# Patient Record
Sex: FEMALE | Race: WHITE | NOT HISPANIC OR LATINO | Employment: FULL TIME | URBAN - NONMETROPOLITAN AREA
[De-identification: names, ages, dates, MRNs, and addresses within clinical notes are randomized per-mention and may not be internally consistent; named-entity substitution may affect disease eponyms.]

---

## 2020-06-24 ENCOUNTER — OFFICE VISIT (OUTPATIENT)
Dept: FAMILY MEDICINE CLINIC | Facility: CLINIC | Age: 35
End: 2020-06-24

## 2020-06-24 VITALS
TEMPERATURE: 97.1 F | DIASTOLIC BLOOD PRESSURE: 70 MMHG | BODY MASS INDEX: 37.92 KG/M2 | OXYGEN SATURATION: 99 % | SYSTOLIC BLOOD PRESSURE: 110 MMHG | WEIGHT: 241.6 LBS | HEART RATE: 87 BPM | HEIGHT: 67 IN

## 2020-06-24 DIAGNOSIS — E07.9 THYROID DISEASE: Primary | ICD-10-CM

## 2020-06-24 DIAGNOSIS — R53.83 FATIGUE, UNSPECIFIED TYPE: ICD-10-CM

## 2020-06-24 DIAGNOSIS — G43.901 MIGRAINE WITH STATUS MIGRAINOSUS, NOT INTRACTABLE, UNSPECIFIED MIGRAINE TYPE: ICD-10-CM

## 2020-06-24 DIAGNOSIS — F33.1 MODERATE EPISODE OF RECURRENT MAJOR DEPRESSIVE DISORDER (HCC): ICD-10-CM

## 2020-06-24 PROBLEM — F32.A DEPRESSION: Status: ACTIVE | Noted: 2020-06-24

## 2020-06-24 PROCEDURE — 99203 OFFICE O/P NEW LOW 30 MIN: CPT | Performed by: NURSE PRACTITIONER

## 2020-06-24 PROCEDURE — 83735 ASSAY OF MAGNESIUM: CPT | Performed by: NURSE PRACTITIONER

## 2020-06-24 PROCEDURE — 80053 COMPREHEN METABOLIC PANEL: CPT | Performed by: NURSE PRACTITIONER

## 2020-06-24 PROCEDURE — 85025 COMPLETE CBC W/AUTO DIFF WBC: CPT | Performed by: NURSE PRACTITIONER

## 2020-06-24 PROCEDURE — 82306 VITAMIN D 25 HYDROXY: CPT | Performed by: NURSE PRACTITIONER

## 2020-06-24 PROCEDURE — 82607 VITAMIN B-12: CPT | Performed by: NURSE PRACTITIONER

## 2020-06-24 PROCEDURE — 84443 ASSAY THYROID STIM HORMONE: CPT | Performed by: NURSE PRACTITIONER

## 2020-06-24 PROCEDURE — 83036 HEMOGLOBIN GLYCOSYLATED A1C: CPT | Performed by: NURSE PRACTITIONER

## 2020-06-24 PROCEDURE — 80061 LIPID PANEL: CPT | Performed by: NURSE PRACTITIONER

## 2020-06-24 RX ORDER — BUPROPION HYDROCHLORIDE 150 MG/1
150 TABLET ORAL DAILY
COMMUNITY
End: 2020-06-24 | Stop reason: DRUGHIGH

## 2020-06-24 RX ORDER — BUPROPION HYDROCHLORIDE 75 MG/1
75 TABLET ORAL 2 TIMES DAILY
Qty: 60 TABLET | Refills: 5 | Status: SHIPPED | OUTPATIENT
Start: 2020-06-24 | End: 2021-12-23

## 2020-06-24 RX ORDER — SERTRALINE HYDROCHLORIDE 100 MG/1
100 TABLET, FILM COATED ORAL DAILY
COMMUNITY
Start: 2020-05-26 | End: 2021-03-29 | Stop reason: SDUPTHER

## 2020-06-24 RX ORDER — TOPIRAMATE 25 MG/1
25 TABLET ORAL NIGHTLY
Qty: 30 TABLET | Refills: 5 | Status: SHIPPED | OUTPATIENT
Start: 2020-06-24 | End: 2020-09-28 | Stop reason: SDUPTHER

## 2020-06-24 RX ORDER — LEVOTHYROXINE SODIUM 0.05 MG/1
50 TABLET ORAL DAILY
COMMUNITY
Start: 2020-05-12 | End: 2020-08-04 | Stop reason: SDUPTHER

## 2020-06-25 LAB
25(OH)D3 SERPL-MCNC: 43.3 NG/ML (ref 30–100)
ALBUMIN SERPL-MCNC: 4.4 G/DL (ref 3.5–5.2)
ALBUMIN/GLOB SERPL: 1.5 G/DL
ALP SERPL-CCNC: 86 U/L (ref 39–117)
ALT SERPL W P-5'-P-CCNC: 16 U/L (ref 1–33)
ANION GAP SERPL CALCULATED.3IONS-SCNC: 10.6 MMOL/L (ref 5–15)
AST SERPL-CCNC: 16 U/L (ref 1–32)
BASOPHILS # BLD AUTO: 0.03 10*3/MM3 (ref 0–0.2)
BASOPHILS NFR BLD AUTO: 0.5 % (ref 0–1.5)
BILIRUB SERPL-MCNC: 0.2 MG/DL (ref 0.2–1.2)
BUN BLD-MCNC: 6 MG/DL (ref 6–20)
BUN/CREAT SERPL: 7.2 (ref 7–25)
CALCIUM SPEC-SCNC: 9.3 MG/DL (ref 8.6–10.5)
CHLORIDE SERPL-SCNC: 106 MMOL/L (ref 98–107)
CHOLEST SERPL-MCNC: 133 MG/DL (ref 0–200)
CO2 SERPL-SCNC: 22.4 MMOL/L (ref 22–29)
CREAT BLD-MCNC: 0.83 MG/DL (ref 0.57–1)
DEPRECATED RDW RBC AUTO: 39.1 FL (ref 37–54)
EOSINOPHIL # BLD AUTO: 0.1 10*3/MM3 (ref 0–0.4)
EOSINOPHIL NFR BLD AUTO: 1.5 % (ref 0.3–6.2)
ERYTHROCYTE [DISTWIDTH] IN BLOOD BY AUTOMATED COUNT: 12.7 % (ref 12.3–15.4)
GFR SERPL CREATININE-BSD FRML MDRD: 78 ML/MIN/1.73
GLOBULIN UR ELPH-MCNC: 2.9 GM/DL
GLUCOSE BLD-MCNC: 95 MG/DL (ref 65–99)
HBA1C MFR BLD: 5.3 % (ref 4.8–5.6)
HCT VFR BLD AUTO: 39.6 % (ref 34–46.6)
HDLC SERPL-MCNC: 44 MG/DL (ref 40–60)
HGB BLD-MCNC: 13.2 G/DL (ref 12–15.9)
IMM GRANULOCYTES # BLD AUTO: 0.02 10*3/MM3 (ref 0–0.05)
IMM GRANULOCYTES NFR BLD AUTO: 0.3 % (ref 0–0.5)
LDLC SERPL CALC-MCNC: 58 MG/DL (ref 0–100)
LDLC/HDLC SERPL: 1.33 {RATIO}
LYMPHOCYTES # BLD AUTO: 1.6 10*3/MM3 (ref 0.7–3.1)
LYMPHOCYTES NFR BLD AUTO: 24.1 % (ref 19.6–45.3)
MAGNESIUM SERPL-MCNC: 2 MG/DL (ref 1.6–2.6)
MCH RBC QN AUTO: 28.4 PG (ref 26.6–33)
MCHC RBC AUTO-ENTMCNC: 33.3 G/DL (ref 31.5–35.7)
MCV RBC AUTO: 85.3 FL (ref 79–97)
MONOCYTES # BLD AUTO: 0.39 10*3/MM3 (ref 0.1–0.9)
MONOCYTES NFR BLD AUTO: 5.9 % (ref 5–12)
NEUTROPHILS # BLD AUTO: 4.5 10*3/MM3 (ref 1.7–7)
NEUTROPHILS NFR BLD AUTO: 67.7 % (ref 42.7–76)
NRBC BLD AUTO-RTO: 0 /100 WBC (ref 0–0.2)
PLATELET # BLD AUTO: 202 10*3/MM3 (ref 140–450)
PMV BLD AUTO: 11.6 FL (ref 6–12)
POTASSIUM BLD-SCNC: 4.5 MMOL/L (ref 3.5–5.2)
PROT SERPL-MCNC: 7.3 G/DL (ref 6–8.5)
RBC # BLD AUTO: 4.64 10*6/MM3 (ref 3.77–5.28)
SODIUM BLD-SCNC: 139 MMOL/L (ref 136–145)
TRIGL SERPL-MCNC: 153 MG/DL (ref 0–150)
TSH SERPL DL<=0.05 MIU/L-ACNC: 3.23 UIU/ML (ref 0.27–4.2)
VIT B12 BLD-MCNC: 337 PG/ML (ref 211–946)
VLDLC SERPL-MCNC: 30.6 MG/DL (ref 5–40)
WBC NRBC COR # BLD: 6.64 10*3/MM3 (ref 3.4–10.8)

## 2020-06-29 ENCOUNTER — TELEPHONE (OUTPATIENT)
Dept: FAMILY MEDICINE CLINIC | Facility: CLINIC | Age: 35
End: 2020-06-29

## 2020-06-29 NOTE — TELEPHONE ENCOUNTER
----- Message from COCO Waite sent at 6/29/2020  9:12 AM EDT -----  Labs are ok will a minimal elevation of triglycerides diet and exercise as discussed at visit.  Continue with current dose of synthroid    Spoke with patient and made aware. She voiced understanding.

## 2020-06-30 NOTE — PROGRESS NOTES
Subjective   Preeti Lamb is a 34 y.o. female.   Chief Compliant: The patient presents with Establish Care and Thyroid Problem (labs needed)    Presents today as a new patient to establish care.      Thyroid Problem   Presents for initial (Known hypothyroidism) visit. The condition has lasted for 1 year. Symptoms include anxiety, depressed mood (only takes wellbutrin at times due to loss of any emotion or feelings.) and fatigue. Patient reports no visual change or weight gain. The symptoms have been stable.   Headache    This is a recurrent problem. The current episode started more than 1 year ago. Episode frequency: Now occuring daily. The problem has been unchanged. The pain is located in the occipital region. The pain does not radiate. The quality of the pain is described as pulsating, dull and aching. The pain is at a severity of 5/10. The pain is moderate. Associated symptoms include blurred vision, muscle aches (drops things from time to time), phonophobia, photophobia and scalp tenderness. Pertinent negatives include no abnormal behavior, back pain, fever, insomnia, loss of balance or visual change. Nothing aggravates the symptoms. She has tried NSAIDs, darkened room, Excedrin, cold packs and acetaminophen for the symptoms. The treatment provided mild relief. Her past medical history is significant for migraine headaches.   Depression   Visit Type: initial (started treatment at the age of 11)  Progression since onset: stable  Patient presents with the following symptoms: depressed mood (only takes wellbutrin at times due to loss of any emotion or feelings.) and nervousness/anxiety.  Patient is not experiencing: insomnia, suicidal planning, thoughts of death and weight gain.  Frequency of symptoms: most days   Severity: mild   Aggravated by: family issues  Sleep quality: fair  Nighttime awakenings: occasional       The following portions of the patient's history were reviewed and updated as appropriate:  "allergies, current medications, past family history, past social history, past surgical history and problem list.      Review of Systems   Constitutional: Positive for fatigue. Negative for fever and weight gain.   Eyes: Positive for blurred vision and photophobia.   Respiratory: Negative.    Cardiovascular: Negative.    Musculoskeletal: Negative for back pain.   Neurological: Positive for headaches. Negative for loss of balance.   Psychiatric/Behavioral: The patient is nervous/anxious. The patient does not have insomnia.    All other systems reviewed and are negative.      Procedures    Vitals: Blood pressure 110/70, pulse 87, temperature 97.1 °F (36.2 °C), temperature source Temporal, height 170.2 cm (67\"), weight 110 kg (241 lb 9.6 oz), SpO2 99 %.     Allergies: No Known Allergies       Objective   Physical Exam   Constitutional: She is oriented to person, place, and time. She appears well-developed and well-nourished. No distress.   HENT:   Head: Normocephalic.   Right Ear: External ear normal.   Left Ear: External ear normal.   Nose: Nose normal.   Mouth/Throat: Oropharynx is clear and moist. No oropharyngeal exudate.   Eyes: Pupils are equal, round, and reactive to light. Conjunctivae and EOM are normal. Right eye exhibits no discharge. Left eye exhibits no discharge.   Neck: Neck supple.   Cardiovascular: Normal rate, regular rhythm and normal heart sounds.   No murmur heard.  Pulmonary/Chest: Effort normal and breath sounds normal.   Musculoskeletal: Normal range of motion.   Neurological: She is alert and oriented to person, place, and time. She displays normal reflexes. No cranial nerve deficit or sensory deficit. She exhibits normal muscle tone. Coordination normal.   Skin: Skin is warm and dry. She is not diaphoretic.   Psychiatric: She has a normal mood and affect. Her behavior is normal.   Nursing note and vitals reviewed.      During this visit the following were done:  Labs Reviewed []    Labs " Ordered [x]    Radiology Reports Reviewed []    Radiology Ordered []    PCP Records Reviewed []    Referring Provider Records Reviewed []    ER Records Reviewed []    Hospital Records Reviewed []    History Obtained From Family []    Radiology Images Reviewed []    Other Reviewed []    Records Requested []      Assessment/Plan   Discussed with patient impression and plan, patient verbalizes understanding  Preeti was seen today for establish care and thyroid problem.    Diagnoses and all orders for this visit:    Thyroid disease  -     TSH; Future  -     TSH    Fatigue, unspecified type  -     CBC Auto Differential; Future  -     Comprehensive Metabolic Panel; Future  -     Hemoglobin A1c; Future  -     Lipid Panel; Future  -     Magnesium; Future  -     TSH; Future  -     Vitamin B12; Future  -     Vitamin D 25 Hydroxy; Future  -     CBC Auto Differential  -     Comprehensive Metabolic Panel  -     Hemoglobin A1c  -     Lipid Panel  -     Magnesium  -     TSH  -     Vitamin B12  -     Vitamin D 25 Hydroxy    Migraine with status migrainosus, not intractable, unspecified migraine type  -     CBC Auto Differential; Future  -     Comprehensive Metabolic Panel; Future  -     Magnesium; Future  -     Vitamin B12; Future  -     topiramate (TOPAMAX) 25 MG tablet; Take 1 tablet by mouth Every Night.  -     CBC Auto Differential  -     Comprehensive Metabolic Panel  -     Magnesium  -     Vitamin B12    Moderate episode of recurrent major depressive disorder (CMS/HCC)  -     buPROPion (WELLBUTRIN) 75 MG tablet; Take 1 tablet by mouth 2 (Two) Times a Day.             Patient's Body mass index is 37.84 kg/m². BMI is above normal parameters. Recommendations include: exercise counseling and nutrition counseling.

## 2020-08-04 DIAGNOSIS — E07.9 THYROID DISEASE: ICD-10-CM

## 2020-08-04 RX ORDER — LEVOTHYROXINE SODIUM 0.05 MG/1
50 TABLET ORAL DAILY
Qty: 30 TABLET | Refills: 1 | Status: SHIPPED | OUTPATIENT
Start: 2020-08-04 | End: 2020-10-19

## 2020-09-28 ENCOUNTER — OFFICE VISIT (OUTPATIENT)
Dept: FAMILY MEDICINE CLINIC | Facility: CLINIC | Age: 35
End: 2020-09-28

## 2020-09-28 VITALS
WEIGHT: 243 LBS | BODY MASS INDEX: 38.14 KG/M2 | HEART RATE: 80 BPM | DIASTOLIC BLOOD PRESSURE: 64 MMHG | SYSTOLIC BLOOD PRESSURE: 108 MMHG | OXYGEN SATURATION: 98 % | HEIGHT: 67 IN | TEMPERATURE: 97.3 F

## 2020-09-28 DIAGNOSIS — R51.9 CHRONIC DAILY HEADACHE: Primary | ICD-10-CM

## 2020-09-28 DIAGNOSIS — E07.9 THYROID DISEASE: ICD-10-CM

## 2020-09-28 DIAGNOSIS — F33.1 MODERATE EPISODE OF RECURRENT MAJOR DEPRESSIVE DISORDER (HCC): ICD-10-CM

## 2020-09-28 PROCEDURE — 99214 OFFICE O/P EST MOD 30 MIN: CPT | Performed by: NURSE PRACTITIONER

## 2020-09-28 RX ORDER — TOPIRAMATE 25 MG/1
100 TABLET ORAL NIGHTLY
Qty: 120 TABLET | Refills: 5 | Status: SHIPPED | OUTPATIENT
Start: 2020-09-28 | End: 2020-10-29

## 2020-09-29 NOTE — PROGRESS NOTES
Subjective   Preeti Lamb is a 35 y.o. female.   Chief Compliant: The patient presents with Follow-up and Migraine    Thyroid Problem  Presents for initial (Known hypothyroidism) visit. The condition has lasted for 1 year. Symptoms include anxiety, depressed mood (only takes wellbutrin at times due to loss of any emotion or feelings.) and fatigue. Patient reports no tremors or weight gain. The symptoms have been stable.   Headache   This is a chronic problem. The current episode started more than 1 year ago. The problem occurs daily. The problem has been unchanged (Initially with new start of Topamax felt she experienced less headache.  Now back to daily aggravation.  Not sleeping well falls asleep and awakes in the night restless the rest of the time). The pain is located in the occipital region. The pain does not radiate. The quality of the pain is described as pulsating, dull and aching. The pain is at a severity of 5/10. The pain is moderate. Pertinent negatives include no dizziness, numbness, seizures or weakness. Nothing aggravates the symptoms. She has tried NSAIDs, darkened room, Excedrin, cold packs and acetaminophen for the symptoms. The treatment provided mild relief.   Depression  Visit Type: initial (started treatment at the age of 11.  Feels her symtpoms are stable. )  Progression since onset: stable  Patient presents with the following symptoms: depressed mood (only takes wellbutrin at times due to loss of any emotion or feelings.) and nervousness/anxiety.  Patient is not experiencing: suicidal ideas, suicidal planning, thoughts of death and weight gain.  Frequency of symptoms: most days   Severity: mild   Aggravated by: family issues  Sleep quality: fair  Nighttime awakenings: occasional       The following portions of the patient's history were reviewed and updated as appropriate: allergies, current medications, past family history, past social history, past surgical history and problem  "list.      Review of Systems   Constitutional: Positive for fatigue. Negative for weight gain.   Respiratory: Negative.    Cardiovascular: Negative.    Neurological: Positive for headaches. Negative for dizziness, tremors, seizures, syncope, facial asymmetry, speech difficulty, weakness, light-headedness and numbness.   Psychiatric/Behavioral: Positive for agitation and sleep disturbance. Negative for self-injury and suicidal ideas. The patient is nervous/anxious.    All other systems reviewed and are negative.      Procedures    Vitals: Blood pressure 108/64, pulse 80, temperature 97.3 °F (36.3 °C), height 170.2 cm (67\"), weight 110 kg (243 lb), SpO2 98 %, not currently breastfeeding.     Allergies: No Known Allergies       Objective   Physical Exam   Constitutional: She is oriented to person, place, and time. She appears well-developed. No distress.   HENT:   Head: Normocephalic.   Right Ear: Tympanic membrane, external ear and ear canal normal.   Left Ear: Tympanic membrane, external ear and ear canal normal.   Nose: Nose normal. No rhinorrhea or congestion.   Mouth/Throat: No oropharyngeal exudate.   Eyes: Pupils are equal, round, and reactive to light. Conjunctivae are normal. Right eye exhibits no discharge. Left eye exhibits no discharge.   Neck: Neck supple.   Cardiovascular: Normal rate, regular rhythm and normal heart sounds.   No murmur heard.  Pulmonary/Chest: Effort normal and breath sounds normal.   Musculoskeletal: Normal range of motion.   Neurological: She is alert and oriented to person, place, and time. She displays no weakness and normal reflexes. No cranial nerve deficit or sensory deficit. She exhibits normal muscle tone. Coordination and gait normal.   Skin: Skin is warm and dry. She is not diaphoretic.   Psychiatric: Her behavior is normal.   Nursing note and vitals reviewed.      During this visit the following were done:  Labs Reviewed []    Labs Ordered []    Radiology Reports Reviewed [x] "    Radiology Ordered [x]    PCP Records Reviewed []    Referring Provider Records Reviewed []    ER Records Reviewed []    Hospital Records Reviewed []    History Obtained From Family []    Radiology Images Reviewed []    Other Reviewed []    Records Requested []      Assessment/Plan   Discussed with patient impression and plan, patient verbalizes understanding  Preeti was seen today for follow-up and migraine.    Diagnoses and all orders for this visit:    Chronic daily headache  -     CT Head Without Contrast  -     topiramate (TOPAMAX) 25 MG tablet; Take 4 tablets by mouth Every Night.    Thyroid disease  Continue with current dose of synthroid    Moderate episode of recurrent major depressive disorder (CMS/HCC)  Continue with current meds             Patient's Body mass index is 38.06 kg/m². BMI is above normal parameters. Recommendations include: exercise counseling and nutrition counseling.

## 2020-10-05 ENCOUNTER — HOSPITAL ENCOUNTER (OUTPATIENT)
Dept: CT IMAGING | Facility: HOSPITAL | Age: 35
Discharge: HOME | End: 2020-10-05
Admitting: NURSE PRACTITIONER

## 2020-10-05 PROCEDURE — 70450 CT HEAD/BRAIN W/O DYE: CPT | Performed by: RADIOLOGY

## 2020-10-05 PROCEDURE — 70450 CT HEAD/BRAIN W/O DYE: CPT

## 2020-10-06 ENCOUNTER — TELEPHONE (OUTPATIENT)
Dept: FAMILY MEDICINE CLINIC | Facility: CLINIC | Age: 35
End: 2020-10-06

## 2020-10-06 DIAGNOSIS — R51.9 CHRONIC DAILY HEADACHE: Primary | ICD-10-CM

## 2020-10-06 NOTE — TELEPHONE ENCOUNTER
----- Message from COCO Waite sent at 10/6/2020  9:04 AM EDT -----  Let patient know her Scan is NEG.  Would like to refer to Neurology Referral placed      Spoke with patient,she does request referral,no preference.

## 2020-10-19 DIAGNOSIS — E07.9 THYROID DISEASE: ICD-10-CM

## 2020-10-19 RX ORDER — LEVOTHYROXINE SODIUM 0.05 MG/1
50 TABLET ORAL DAILY
Qty: 30 TABLET | Refills: 5 | Status: SHIPPED | OUTPATIENT
Start: 2020-10-19 | End: 2022-03-29 | Stop reason: SDUPTHER

## 2020-10-29 ENCOUNTER — OFFICE VISIT (OUTPATIENT)
Dept: NEUROLOGY | Facility: CLINIC | Age: 35
End: 2020-10-29

## 2020-10-29 VITALS
DIASTOLIC BLOOD PRESSURE: 68 MMHG | TEMPERATURE: 97.3 F | OXYGEN SATURATION: 94 % | SYSTOLIC BLOOD PRESSURE: 116 MMHG | WEIGHT: 242 LBS | HEART RATE: 81 BPM | BODY MASS INDEX: 37.9 KG/M2

## 2020-10-29 DIAGNOSIS — R27.8 CLUMSINESS: ICD-10-CM

## 2020-10-29 DIAGNOSIS — G43.719 INTRACTABLE CHRONIC MIGRAINE WITHOUT AURA AND WITHOUT STATUS MIGRAINOSUS: Primary | ICD-10-CM

## 2020-10-29 DIAGNOSIS — R51.9 INTRACTABLE EPISODIC HEADACHE, UNSPECIFIED HEADACHE TYPE: ICD-10-CM

## 2020-10-29 DIAGNOSIS — R06.83 SNORING: ICD-10-CM

## 2020-10-29 PROBLEM — E28.2 POLYCYSTIC OVARY SYNDROME: Status: ACTIVE | Noted: 2020-10-29

## 2020-10-29 PROBLEM — E06.3 HASHIMOTO'S THYROIDITIS: Status: ACTIVE | Noted: 2020-10-29

## 2020-10-29 PROCEDURE — 99215 OFFICE O/P EST HI 40 MIN: CPT | Performed by: NURSE PRACTITIONER

## 2020-10-29 RX ORDER — FREMANEZUMAB-VFRM 225 MG/1.5ML
675 INJECTION SUBCUTANEOUS
Qty: 3 PEN | Refills: 3 | Status: SHIPPED | OUTPATIENT
Start: 2020-10-29 | End: 2021-12-22

## 2020-10-29 RX ORDER — RIZATRIPTAN BENZOATE 10 MG/1
TABLET ORAL
Qty: 30 TABLET | Refills: 2 | Status: SHIPPED | OUTPATIENT
Start: 2020-10-29 | End: 2021-01-29

## 2020-10-29 NOTE — PROGRESS NOTES
Subjective:     Patient ID: Preeti Lamb is a 35 y.o. female.    CC:   Chief Complaint   Patient presents with   • Migraine   • Headache       HPI:   History of Present Illness     This is a very pleasant 35-year-old female who presents for initial neurological evaluation and daily headaches and migraine headaches present over the past 3 years but significantly worsening over the past year.  Her mom does get migraines.  She tells me every single day she has a headache.  She has at least 8 migraine days per month which are moderate to severe pain, cause blurred vision, cognitive change and clumsiness with dropping items prior to the migraine but denies paresthesias, weakness or slurred speech, hurts in the forehead, throbbing, can move side to side, allodynia, nausea, rare vomiting, positive photophobia and positive phonophobia and normally last 8 hours or more.  Most of the time she will take a Midol complete with Benadryl and go to sleep.  She had been taking Topamax since July 2020 and started the 25 mg and was recently up to 100 mg at night but this caused her to be very slow cognitively and she could not think of words so she stopped taking this about a week ago.  She tells me that it minimally helps with severity of headaches but she still had daily headaches.  She currently is treated for depression with sertraline as well as bupropion and both of these are antidepressants but neither have helped with her migraines at all.  She has not used propranolol a beta-blocker but this would be high risk with her significant depression.  She has not used anything other than Tylenol, ibuprofen or over-the-counter abortive medications for migraines.  She does tell me that she does snore.  She has never had a sleep study.  She does still wake up in the mornings tired.  She and her  do have 3 kids and she tells me that they are finished having children.  She did have a CT scan of her head without contrast completed on  10/5/2020 with [facility] and this was read as normal by radiologist and we reviewed imaging today in office and CT of the brain does appear completely unremarkable.  She did recently have blood work with her PCP in June 2020 including CBC with differential normal, CMP normal, hemoglobin A1c normal at 5.3%, lipid panel normal with triglycerides slight elevation 153, magnesium normal, TSH normal, B12 slightly low normal at 337 and vitamin D 43.3.  She is not sure what has made her headaches worse over the past year.  Sleeping is the only thing that really helps.  She does live in [place], [place] and would like to treat migraines the best possible without having to travel often to [place].    The following portions of the patient's history were reviewed and updated as appropriate: allergies, current medications, past family history, past medical history, past social history, past surgical history and problem list.    Past Medical History:   Diagnosis Date   • Allergic    • Depression    • Functional ovarian cysts    • Hashimoto's disease    • Low serum progesterone    • PCOS (polycystic ovarian syndrome)    • Sinus complaint    • Thyroid disease        Past Surgical History:   Procedure Laterality Date   • DILATATION AND CURETTAGE     • LEEP         Social History     Socioeconomic History   • Marital status:      Spouse name: Not on file   • Number of children: Not on file   • Years of education: Not on file   • Highest education level: Not on file   Tobacco Use   • Smoking status: Never Smoker   • Smokeless tobacco: Never Used   Substance and Sexual Activity   • Alcohol use: Yes     Comment: rarely   • Drug use: Never   • Sexual activity: Yes       Family History   Problem Relation Age of Onset   • Diabetes Mother    • Hypertension Mother    • Crohn's disease Mother    • Migraines Mother    • Hypertension Father    • Alcohol abuse Father    • Atrial fibrillation Father    • Thyroid disease Maternal  Grandmother         Review of Systems   Constitutional: Positive for fatigue. Negative for chills, fever and unexpected weight change.   HENT: Negative for ear pain, hearing loss, nosebleeds, rhinorrhea and sore throat.    Eyes: Positive for visual disturbance. Negative for photophobia, pain, discharge and itching.   Respiratory: Negative for cough, chest tightness, shortness of breath and wheezing.    Cardiovascular: Negative for chest pain, palpitations and leg swelling.   Gastrointestinal: Negative for abdominal pain, blood in stool, constipation, diarrhea, nausea and vomiting.   Genitourinary: Positive for frequency. Negative for dysuria, hematuria and urgency.   Musculoskeletal: Negative for arthralgias, back pain, gait problem, joint swelling, myalgias, neck pain and neck stiffness.   Skin: Negative for rash and wound.   Allergic/Immunologic: Negative for environmental allergies and food allergies.   Neurological: Positive for headaches. Negative for dizziness, tremors, seizures, syncope, speech difficulty, weakness, light-headedness and numbness.   Hematological: Negative for adenopathy. Does not bruise/bleed easily.   Psychiatric/Behavioral: Positive for sleep disturbance. Negative for agitation, confusion, decreased concentration, hallucinations and suicidal ideas. The patient is not nervous/anxious.    All other systems reviewed and are negative.       Objective:  /68   Pulse 81   Temp 97.3 °F (36.3 °C)   Wt 110 kg (242 lb)   SpO2 94%   BMI 37.90 kg/m²     Neurologic Exam     Mental Status   Oriented to person, place, and time.   Registration: recalls 3 of 3 objects. Recall at 5 minutes: recalls 3 of 3 objects. Follows 3 step commands.   Attention: normal. Concentration: normal.   Speech: speech is normal   Level of consciousness: alert  Knowledge: good and consistent with education. Able to perform simple calculations.   Able to name object. Able to read. Able to repeat. Able to write. Normal  comprehension.     Cranial Nerves   Cranial nerves II through XII intact.     CN III, IV, VI   Pupils are equal, round, and reactive to light.    Motor Exam   Muscle bulk: normal  Overall muscle tone: normal    Strength   Strength 5/5 throughout.     Sensory Exam   Light touch normal.   Vibration normal.   Proprioception normal.   Pinprick normal.     Gait, Coordination, and Reflexes     Gait  Gait: normal    Coordination   Romberg: negative  Finger to nose coordination: normal  Heel to shin coordination: normal    Tremor   Resting tremor: absent  Intention tremor: absent  Action tremor: absent    Reflexes   Right brachioradialis: 2+  Left brachioradialis: 2+  Right biceps: 2+  Left biceps: 2+  Right triceps: 2+  Left triceps: 2+  Right patellar: 2+  Left patellar: 2+  Right achilles: 2+  Left achilles: 2+  Right : 2+  Left : 2+  Right plantar: normal  Left plantar: normal  Right Ham: absent  Left Ham: absent  Right ankle clonus: absent  Left ankle clonus: absent      Physical Exam  Constitutional:       Appearance: She is obese.   Eyes:      Pupils: Pupils are equal, round, and reactive to light.   Neck:      Musculoskeletal: Normal range of motion. Muscular tenderness present. No edema, erythema, neck rigidity, crepitus, injury, pain with movement, torticollis or spinous process tenderness.   Cardiovascular:      Rate and Rhythm: Normal rate and regular rhythm.      Heart sounds: Normal heart sounds, S1 normal and S2 normal.   Pulmonary:      Effort: Pulmonary effort is normal.      Breath sounds: Normal breath sounds.   Neurological:      Mental Status: She is alert and oriented to person, place, and time.      Coordination: Finger-Nose-Finger Test, Heel to Shin Test and Romberg Test normal.      Gait: Gait is intact.      Deep Tendon Reflexes: Strength normal.      Reflex Scores:       Tricep reflexes are 2+ on the right side and 2+ on the left side.       Bicep reflexes are 2+ on the right side  and 2+ on the left side.       Brachioradialis reflexes are 2+ on the right side and 2+ on the left side.       Patellar reflexes are 2+ on the right side and 2+ on the left side.       Achilles reflexes are 2+ on the right side and 2+ on the left side.  Psychiatric:         Attention and Perception: Attention and perception normal.         Mood and Affect: Mood and affect normal.         Speech: Speech normal.         Behavior: Behavior normal.         Thought Content: Thought content normal.         Cognition and Memory: Cognition and memory normal.         Judgment: Judgment normal.         Assessment/Plan:       Diagnoses and all orders for this visit:    1. Intractable chronic migraine without aura and without status migrainosus (Primary)  -     Ajovy 225 MG/1.5ML solution auto-injector; Inject 675 mg under the skin into the appropriate area as directed Every 3 (Three) Months.  Dispense: 3 pen; Refill: 3  -     rizatriptan (Maxalt) 10 MG tablet; Take 1 tablet at onset of migraine. May repeat in 2 hours if needed  Dispense: 30 tablet; Refill: 2    2. Intractable episodic headache, unspecified headache type  -     MRI Brain Without Contrast; Future    3. Clumsiness  -     MRI Brain Without Contrast; Future    4. Snoring  -     Ambulatory Referral to Sleep Medicine           Total time of visit 40 minutes face-to-face with 30 minutes spent discussion and counseling on results and manage care moving forward.  Recommend MRI of the brain without contrast rule out brain lesion with headaches and clumsiness.  For her migraines we will start with Ajovy injections monthly for migraine prevention.  I did also discuss Botox injections every 12 weeks but due to her long commute she would like to try the monthly injections first.  We will add rizatriptan for abortive migraine treatment.  Referral to sleep medicine for telehealth consultation for likely sleep apnea.  Follow-up in 3 months via telehealth video visit or sooner  if needed.  Reviewed medications, potential side effects and signs and symptoms to report. Discussed risk versus benefits of treatment plan with patient and/or family-including medications, labs and radiology that may be ordered. Addressed questions and concerns during visit. Patient and/or family verbalized understanding and agree with plan.    AS THE PROVIDER, I PERSONALLY WORE PPE DURING ENTIRE FACE TO FACE ENCOUNTER IN CLINIC WITH THE PATIENT. PATIENT ALSO WORE PPE DURING ENTIRE FACE TO FACE ENCOUNTER EXCEPT FOR A MAX OF 30 SECONDS DURING NEUROLOGICAL EVALUATION OF CRANIAL NERVES AND THEN MASK WAS PLACED BACK OVER PATIENT FACE FOR REMAINDER OF VISIT. I WASHED MY HANDS BEFORE AND AFTER VISIT.    During this visit the following were done:  Labs Reviewed [x]    Labs Ordered []    Radiology Reports Reviewed [x]    Radiology Ordered [x]    PCP Records Reviewed [x]    Referring Provider Records Reviewed [x]    ER Records Reviewed []    Hospital Records Reviewed []    History Obtained From Family []    Radiology Images Reviewed [x]    Other Reviewed [x]    Records Requested []      I am prescribing AJOVY Injections for migraine prevention management for this patient.This is a brand new classification human monoclonal antibody that binds to CGRP ligand and blocks it from binding to the receptor which is shown to prevent migraines. There is limited long term data on the effects of this medication and this has been disclosed to the patient before prescribing this medication. There is no contraindication at this time for this medication. It is FDA approved for episodic and chronic migraine prevention.      The only side effects reported during clinical trials were injection site irritation, redness and tenderness that can last 12-72 hours and should self resolve. I have also educated patient that this drug is not metabolized by the kidneys or liver and at this time no adverse effects or risks are known with liver and  kidney disease.     I have also discussed that this medication has not been evaluated for potential for immunogenicity.     I have also discussed that this medication has not been studied in patients 65 years of age or older and also Cardiovascular disease including MI, TIA, CVA, CAD etc within 1 year of starting the medication; these patient's were excluded from the trials.     There is no adequate data available on pregnancy and breastfeeding but we do know the medication does pass through placenta and breast milk and at this time there is no contraindication for pregnancy and lactation but there is insufficient date for me to feel comfortable to prescribe this for that population. If you do become pregnant please notify our office so we can stop this medication. This medication also takes 5 months to be cleared from your system so if you are planning to become pregnant, please discuss with me ahead of time so we can stop this medication and have 5 months of medication clearance before attempting to conceive to ensure no adverse effects for the fetus.    Cancer, Genetic Mutations and Impairment in Fertility of Ajovy have not been studied.     I have reviewed administration education with patient and/or family and provided a hand out on safe administration and discarding of Ajovy. Encouraged patient to report any adverse effects.     Patient has expressed understanding of the above discussed and accepts risk and benefits of starting this medication.    Shanon Herrera, APRN  10/29/2020

## 2020-11-10 ENCOUNTER — TELEPHONE (OUTPATIENT)
Dept: NEUROLOGY | Facility: CLINIC | Age: 35
End: 2020-11-10

## 2020-11-10 NOTE — TELEPHONE ENCOUNTER
----- Message from Preeti Lamb sent at 11/10/2020  2:45 PM EST -----  Regarding: RE: Referral Request  Contact: [phone]  The MRI you requested has been denied by my insurance. What is the next step?

## 2020-11-11 NOTE — TELEPHONE ENCOUNTER
Her CT brain was normal and we cannot resubmit anything for 90 days ([place] medicaid won't allow) so it will need to be canceled for now. Let patient know her insurance will not approve the MRI of her brain. If her migraines and clumsiness are not better when we complete her next visit, we can try to reorder. Thanks, Shanon

## 2021-01-13 ENCOUNTER — TELEMEDICINE (OUTPATIENT)
Dept: SLEEP MEDICINE | Facility: HOSPITAL | Age: 36
End: 2021-01-13

## 2021-01-13 DIAGNOSIS — F51.04 PSYCHOPHYSIOLOGICAL INSOMNIA: ICD-10-CM

## 2021-01-13 DIAGNOSIS — G47.33 OBSTRUCTIVE SLEEP APNEA, ADULT: ICD-10-CM

## 2021-01-13 DIAGNOSIS — E66.09 CLASS 2 OBESITY DUE TO EXCESS CALORIES WITHOUT SERIOUS COMORBIDITY WITH BODY MASS INDEX (BMI) OF 36.0 TO 36.9 IN ADULT: ICD-10-CM

## 2021-01-13 DIAGNOSIS — R06.83 SNORING: Primary | ICD-10-CM

## 2021-01-13 PROCEDURE — 99203 OFFICE O/P NEW LOW 30 MIN: CPT | Performed by: INTERNAL MEDICINE

## 2021-01-14 NOTE — PROGRESS NOTES
Subjective   Preeti Lamb is a 35 y.o. female is being seen for consultation today at the request of ANSELMO Carrera for the evaluation of difficulty getting to sleep and staying asleep.  Her primary care provider is [facility] Waite.  You have chosen to receive care through a telehealth visit.  Do you consent to use a video/audio connection for your medical care today? Yes    History of Present Illness  Patient has been having problems with a lot of headaches and has been seeing neurology.  She is referred for evaluation of possible sleep disordered breathing is contributing factor.  She says she has both problems falling asleep staying asleep at night.  She awakens with a headache almost every day.  She has a history of snoring for 13 years.  She has also had apneas noted for 13 years.  She is sleepy during the day but does not fall asleep.  She denies problems driving.    She has a lot of nasal allergies but denies ever breaking her nose.  She has occasional kicking of her legs at night and sometimes has an uncomfortable feeling in her legs.  She denies any history of iron deficiency.  She does not think her leg movements keeps her awake.  She denies having chronic pain that keeps her awake.    She goes to bed about 10 PM.  She will fall asleep in about an hour.  She awakens a couple of times during the night.  She thinks she gets about 5 hours of sleep but is not rested.  She denies any history of hypertension, diabetes, or coronary artery disease.  She has a history of hypothyroidism and depression.  She was previously diagnosed with Hashimoto's thyroiditis  Allergies   Allergen Reactions   • Penicillins Hives   She has seasonal environmental allergies      Current Outpatient Medications:   •  Ajovy 225 MG/1.5ML solution auto-injector, Inject 675 mg under the skin into the appropriate area as directed Every 3 (Three) Months., Disp: 3 pen, Rfl: 3  •  buPROPion (WELLBUTRIN) 75 MG tablet, Take 1 tablet by  mouth 2 (Two) Times a Day., Disp: 60 tablet, Rfl: 5  •  levothyroxine (SYNTHROID, LEVOTHROID) 50 MCG tablet, TAKE 1 TABLET BY MOUTH DAILY, Disp: 30 tablet, Rfl: 5  •  rizatriptan (Maxalt) 10 MG tablet, Take 1 tablet at onset of migraine. May repeat in 2 hours if needed, Disp: 30 tablet, Rfl: 2  •  sertraline (ZOLOFT) 100 MG tablet, Take 100 mg by mouth Daily., Disp: , Rfl:     Social History    Tobacco Use      Smoking status: Never Smoker      Smokeless tobacco: Never Used       Social History     Substance and Sexual Activity   Alcohol Use Yes    Comment: rarely       Caffeine: She has 1 cup of coffee per day and may have 1 cola every other day    Past Medical History:   Diagnosis Date   • Allergic    • Depression    • Functional ovarian cysts    • Hashimoto's disease    • Low serum progesterone    • PCOS (polycystic ovarian syndrome)    • Sinus complaint    • Thyroid disease        Past Surgical History:   Procedure Laterality Date   • DILATATION AND CURETTAGE x3     • LEEP         Family History   Problem Relation Age of Onset   • Diabetes Mother    • Hypertension Mother    • Crohn's disease Mother    • Migraines Mother    • Hypertension Father    • Alcohol abuse Father    • Atrial fibrillation Father    • Thyroid disease Maternal Grandmother        The following portions of the patient's history were reviewed and updated as appropriate: allergies, current medications, past family history, past medical history, past social history, past surgical history and problem list.    Review of Systems   Constitutional: Positive for fatigue.   Eyes: Negative.    Respiratory: Negative.    Cardiovascular: Negative.    Gastrointestinal: Negative.    Endocrine: Negative.    Genitourinary: Positive for frequency and urgency.   Musculoskeletal: Negative.    Skin: Negative.    Allergic/Immunologic: Positive for environmental allergies.   Neurological: Positive for weakness and headaches.   Hematological: Negative.     Psychiatric/Behavioral: Negative.    [place] score is 5/24    Objective     There were no vitals taken for this visit.     Physical Exam  Patient is awake and alert.  She does not appear to be in acute respiratory distress.  Her stated weight is 243 pounds.  Her height is 5 feet 7 inches.  Her body mass index is 36.  She appears to have Mallampati class III anatomy.    Assessment/Plan   Diagnoses and all orders for this visit:    1. Snoring (Primary)  -     Home Sleep Study; Future    2. Obstructive sleep apnea, adult  -     Home Sleep Study; Future    3. Psychophysiological insomnia    4. Class 2 obesity due to excess calories without serious comorbidity with body mass index (BMI) of 36.0 to 36.9 in adult    Patient has a history of snoring and frequently nonrestorative sleep.  She gives an excellent story for obstructive sleep apnea.  We will plan to proceed to home sleep testing.  We have discussed possible therapies including CPAP, weight control, oral appliance, and surgery.  We have also discussed the long-term consequences of untreated obstructive sleep apnea.  These include hypertension, diabetes, heart disease, stroke, and dementia.  She is encouraged to lose weight.  She is encouraged to avoid alcohol and sedatives close to bedtime.  She is encouraged to practice lateral position sleep.  We will see her back after her study.    Patient also has some psychophysiologic insomnia.  We have discussed measures to try to improve her sleep hygiene including getting a regular bedtime and a regular rise time.  She is encouraged to exercise regularly.  She is to get light exposure early in the day.  She is develop a bedtime ritual.  This may need to be addressed further after her sleep study.    Total time: 30 minutes exclusive of procedures.         Jak Mancini MD [facility]  Sleep Medicine  Pulmonary and Critical Care Medicine

## 2021-01-19 ENCOUNTER — HOSPITAL ENCOUNTER (OUTPATIENT)
Dept: SLEEP MEDICINE | Facility: HOSPITAL | Age: 36
Discharge: HOME | End: 2021-01-19
Admitting: INTERNAL MEDICINE

## 2021-01-19 VITALS — WEIGHT: 243 LBS | HEIGHT: 67 IN | BODY MASS INDEX: 38.14 KG/M2

## 2021-01-19 DIAGNOSIS — G47.33 OBSTRUCTIVE SLEEP APNEA, ADULT: ICD-10-CM

## 2021-01-19 DIAGNOSIS — R06.83 SNORING: ICD-10-CM

## 2021-01-19 PROCEDURE — 95800 SLP STDY UNATTENDED: CPT

## 2021-01-19 PROCEDURE — 95800 SLP STDY UNATTENDED: CPT | Performed by: INTERNAL MEDICINE

## 2021-01-20 DIAGNOSIS — G47.33 OSA (OBSTRUCTIVE SLEEP APNEA): Primary | ICD-10-CM

## 2021-01-21 NOTE — PROGRESS NOTES
CALLED PATIENT AND ADVISED OF STUDY RESULTS. PATIENT VERBALIZED UNDERSTANDING AND WAS AGREEABLE TO PAP THERAPY. FAXED ORDER TO [facility] 01/21/21 [facility]

## 2021-01-29 ENCOUNTER — TELEPHONE (OUTPATIENT)
Dept: NEUROLOGY | Facility: CLINIC | Age: 36
End: 2021-01-29

## 2021-01-29 ENCOUNTER — TELEMEDICINE (OUTPATIENT)
Dept: NEUROLOGY | Facility: CLINIC | Age: 36
End: 2021-01-29

## 2021-01-29 DIAGNOSIS — G43.009 MIGRAINE WITHOUT AURA AND WITHOUT STATUS MIGRAINOSUS, NOT INTRACTABLE: ICD-10-CM

## 2021-01-29 DIAGNOSIS — G43.719 INTRACTABLE CHRONIC MIGRAINE WITHOUT AURA AND WITHOUT STATUS MIGRAINOSUS: Primary | ICD-10-CM

## 2021-01-29 PROCEDURE — 99213 OFFICE O/P EST LOW 20 MIN: CPT | Performed by: NURSE PRACTITIONER

## 2021-01-29 RX ORDER — RIMEGEPANT SULFATE 75 MG/75MG
75 TABLET, ORALLY DISINTEGRATING ORAL DAILY PRN
Qty: 8 TABLET | Refills: 5 | Status: SHIPPED | OUTPATIENT
Start: 2021-01-29 | End: 2022-03-29

## 2021-01-29 RX ORDER — SUMATRIPTAN 50 MG/1
50 TABLET, FILM COATED ORAL ONCE AS NEEDED
Qty: 30 TABLET | Refills: 2 | Status: SHIPPED | OUTPATIENT
Start: 2021-01-29 | End: 2022-03-29 | Stop reason: ALTCHOICE

## 2021-01-29 NOTE — PROGRESS NOTES
Subjective:     Patient ID: Preeti Lamb is a 35 y.o. female.    CC:   Chief Complaint   Patient presents with   • Migraine       HPI:   History of Present Illness     Due to COVID-19 Pandemic, this visit was completed face to face via VIDEO by Epic/[employer] with verbal consent to treat obtained from patient.      You have chosen to receive care through a telehealth visit.  Do you consent to use a video/audio connection for your medical care today? Yes    This is a very pleasant 35-year-old female who presents for 3 month follow up on daily headaches and migraine headaches present over the past 3 years but significantly worsening over the past year.  Her mom does get migraines.  She tells me every single day she has a headache.  She has at least 8 migraine days per month which are moderate to severe pain, cause blurred vision, cognitive change and clumsiness with dropping items prior to the migraine but denies paresthesias, weakness or slurred speech, hurts in the forehead, throbbing, can move side to side, allodynia, nausea, rare vomiting, positive photophobia and positive phonophobia and normally last 8 hours or more.     Since starting Ajovy injections 2 months ago she has seen a reduction in migraine frequency and severity by over 50%. She continues with daily headaches but has only had 2 migraines a month over past 3 months. Rizatriptan does not help at all. Would be willing to try a different triptan or alternative.  She has not had clumsiness with recent migraines.    Previous meds tried: She had been taking Topamax since July 2020 and started the 25 mg and was recently up to 100 mg at night but this caused her to be very slow cognitively and she could not think of words so she stopped taking this about a week ago.  She tells me that it minimally helps with severity of headaches but she still had daily headaches.  She currently is treated for depression with sertraline as well as bupropion and both of these are  antidepressants but neither have helped with her migraines at all.  She has not used propranolol a beta-blocker but this would be high risk with her significant depression.     She did have a CT scan of her head without contrast completed on 10/5/2020 with [facility] and this was read as normal by radiologist and we reviewed imaging at first visit and CT of the brain does appear completely unremarkable. Screening labs with PCP unremarkable.    The following portions of the patient's history were reviewed and updated as appropriate: allergies, current medications, past family history, past medical history, past social history, past surgical history and problem list.    Past Medical History:   Diagnosis Date   • Allergic    • Depression    • Functional ovarian cysts    • Hashimoto's disease    • Low serum progesterone    • PCOS (polycystic ovarian syndrome)    • Sinus complaint    • Thyroid disease        Past Surgical History:   Procedure Laterality Date   • DILATATION AND CURETTAGE     • LEEP         Social History     Socioeconomic History   • Marital status:      Spouse name: Not on file   • Number of children: Not on file   • Years of education: Not on file   • Highest education level: Not on file   Tobacco Use   • Smoking status: Never Smoker   • Smokeless tobacco: Never Used   Substance and Sexual Activity   • Alcohol use: Yes     Comment: rarely   • Drug use: Never   • Sexual activity: Yes       Family History   Problem Relation Age of Onset   • Diabetes Mother    • Hypertension Mother    • Crohn's disease Mother    • Migraines Mother    • Hypertension Father    • Alcohol abuse Father    • Atrial fibrillation Father    • Thyroid disease Maternal Grandmother         Review of Systems   Neurological: Positive for headaches.   All other systems reviewed and are negative.       Objective:  There were no vitals taken for this visit.  Not obtained d/t video visit    Neurologic Exam     Mental Status    Oriented to person, place, and time.   Speech: speech is normal   Level of consciousness: alert    Cranial Nerves     CN II   Visual fields full to confrontation.     CN III, IV, VI   Pupils are equal, round, and reactive to light.  Extraocular motions are normal.     CN VII   Facial expression full, symmetric.     CN VIII   CN VIII normal.       Physical Exam  Eyes:      Extraocular Movements: EOM normal.      Pupils: Pupils are equal, round, and reactive to light.   Neurological:      Mental Status: She is oriented to person, place, and time.   Psychiatric:         Mood and Affect: Mood and affect normal.         Speech: Speech normal.     ARIADNA FULLY D/T VIDEO VISIT     Assessment/Plan:       Diagnoses and all orders for this visit:    1. Intractable chronic migraine without aura and without status migrainosus (Primary)  Comments:  Continue Ajovy injections monthly. Diagnosed with SCOTTIE and pending CPAP use.  Orders:  -     SUMAtriptan (Imitrex) 50 MG tablet; Take 1 tablet by mouth 1 (One) Time As Needed for Migraine. Take one tablet at onset of headache. May repeat dose one time in 2 hours if headache not relieved.  Dispense: 30 tablet; Refill: 2    2. Migraine without aura and without status migrainosus, not intractable  -     rimegepant 75 MG dispersible tablet; Take 75 mg by mouth Daily As Needed (migraine).  Dispense: 8 tablet; Refill: 5           Continue Ajovy injections monthly for migraine prevention. Had recent sleep study confirming sleep apnea-pending CPAP approval w/ insurance. Switch to sumatriptan along with nurtec odt PRN. F/U in 4 months or sooner if needed.    Reviewed medications, potential side effects and signs and symptoms to report. Discussed risk versus benefits of treatment plan with patient and/or family-including medications, labs and radiology that may be ordered. Addressed questions and concerns during visit. Patient and/or family verbalized understanding and agree with plan.    Other  Reviewed [x]  Sleep clinic notes and sleep test results.      Shanon Herrera, APRN  1/29/2021

## 2021-01-29 NOTE — TELEPHONE ENCOUNTER
SCHEDULED PT A 4 MTH VIDEO VISIT AND [facility], MAILED PT A REMINDER

## 2021-01-29 NOTE — TELEPHONE ENCOUNTER
----- Message from COCO Carrera sent at 1/29/2021  9:26 AM EST -----  Call and schedule 4 month video visit follow up. [facility]. Thanks.

## 2021-03-18 ENCOUNTER — BULK ORDERING (OUTPATIENT)
Dept: CASE MANAGEMENT | Facility: OTHER | Age: 36
End: 2021-03-18

## 2021-03-18 DIAGNOSIS — Z23 IMMUNIZATION DUE: ICD-10-CM

## 2021-03-27 RX ORDER — CETIRIZINE HYDROCHLORIDE 10 MG/1
10 TABLET ORAL DAILY
Qty: 90 TABLET | Refills: 1 | Status: SHIPPED | OUTPATIENT
Start: 2021-03-27 | End: 2021-04-05

## 2021-03-29 DIAGNOSIS — F33.1 MODERATE EPISODE OF RECURRENT MAJOR DEPRESSIVE DISORDER (HCC): ICD-10-CM

## 2021-03-29 RX ORDER — SERTRALINE HYDROCHLORIDE 100 MG/1
100 TABLET, FILM COATED ORAL DAILY
Qty: 90 TABLET | Refills: 1 | Status: SHIPPED | OUTPATIENT
Start: 2021-03-29 | End: 2021-10-06

## 2021-04-05 ENCOUNTER — TELEMEDICINE (OUTPATIENT)
Dept: SLEEP MEDICINE | Facility: HOSPITAL | Age: 36
End: 2021-04-05

## 2021-04-05 VITALS — WEIGHT: 240 LBS | HEIGHT: 67 IN | BODY MASS INDEX: 37.67 KG/M2

## 2021-04-05 DIAGNOSIS — G47.33 OSA (OBSTRUCTIVE SLEEP APNEA): Primary | ICD-10-CM

## 2021-04-05 PROCEDURE — 99212 OFFICE O/P EST SF 10 MIN: CPT | Performed by: NURSE PRACTITIONER

## 2021-04-05 NOTE — PROGRESS NOTES
"    Chief Complaint:   Chief Complaint   Patient presents with   • Follow-up       HPI:    Preeti Lamb is a 35 y.o. female here for follow-up of sleep apnea.  Patient was last seen 1/13/2021 with complaints of migraines, trouble falling asleep and staying asleep, and witnessed apneas.  Patient had a sleep study 1/19/2021 that did show mild sleep apnea with an AHI of 6.0 patient did initiate CPAP therapy.  Patient states she is sleeping 7 hours nightly and does feel rested upon awakening.  Patient states she will still occasionally get up during the night but this is \"much improved than before using CPAP.  Patient has an [place] score of 3/24.  Patient is not having any difficulty or complaints with CPAP and wishes to continue.        Current medications are:   Current Outpatient Medications:   •  Ajovy 225 MG/1.5ML solution auto-injector, Inject 675 mg under the skin into the appropriate area as directed Every 3 (Three) Months., Disp: 3 pen, Rfl: 3  •  buPROPion (WELLBUTRIN) 75 MG tablet, Take 1 tablet by mouth 2 (Two) Times a Day., Disp: 60 tablet, Rfl: 5  •  levothyroxine (SYNTHROID, LEVOTHROID) 50 MCG tablet, TAKE 1 TABLET BY MOUTH DAILY, Disp: 30 tablet, Rfl: 5  •  rimegepant 75 MG dispersible tablet, Take 75 mg by mouth Daily As Needed (migraine)., Disp: 8 tablet, Rfl: 5  •  sertraline (ZOLOFT) 100 MG tablet, Take 1 tablet by mouth Daily., Disp: 90 tablet, Rfl: 1  •  SUMAtriptan (Imitrex) 50 MG tablet, Take 1 tablet by mouth 1 (One) Time As Needed for Migraine. Take one tablet at onset of headache. May repeat dose one time in 2 hours if headache not relieved., Disp: 30 tablet, Rfl: 2.      The patient's relevant past medical, surgical, family and social history were reviewed and updated in Epic as appropriate.       Review of Systems   Eyes: Positive for visual disturbance.   Respiratory: Positive for apnea.    Endocrine: Positive for cold intolerance.   Allergic/Immunologic: Positive for environmental " allergies.   Neurological: Positive for headaches.   Psychiatric/Behavioral: Positive for dysphoric mood and sleep disturbance.   All other systems reviewed and are negative.        Objective:    Physical Exam  Constitutional:       Appearance: Normal appearance.   HENT:      Head: Normocephalic and atraumatic.   Pulmonary:      Effort: Pulmonary effort is normal. No respiratory distress.   Skin:     General: Skin is dry.      Coloration: Skin is not pale.   Neurological:      Mental Status: She is alert.   Psychiatric:         Mood and Affect: Mood normal.         Behavior: Behavior normal.         Thought Content: Thought content normal.         Judgment: Judgment normal.     33/35 days of use  Greater than 4-hour use 85.7  90% pressure 9.5  AHI of 3.9  Settings 8-18      ASSESSMENT/PLAN    Diagnoses and all orders for this visit:    1. SCOTTIE (obstructive sleep apnea) (Primary)            1. Counseled patient regarding multimodal approach with healthy nutrition, healthy sleep, regular physical activity, social activities, counseling, and medications. Encouraged to practice lateral sleep position. Avoid alcohol and sedatives close to bedtime.  2.   Refill supplies x1 year.  Return to clinic 1 year or sooner symptoms warrant.  Patient gave verbal consent for video visit.  I have reviewed the results of my evaluation and impression and discussed my recommendations in detail with the patient.      Signed by  COCO Cardoso    April 5, 2021      CC: Trinidad Cardona APRN          No ref. provider found

## 2021-07-13 ENCOUNTER — PRIOR AUTHORIZATION (OUTPATIENT)
Dept: NEUROLOGY | Facility: CLINIC | Age: 36
End: 2021-07-13

## 2021-10-06 DIAGNOSIS — F33.1 MODERATE EPISODE OF RECURRENT MAJOR DEPRESSIVE DISORDER (HCC): ICD-10-CM

## 2021-10-06 RX ORDER — SERTRALINE HYDROCHLORIDE 100 MG/1
100 TABLET, FILM COATED ORAL DAILY
Qty: 90 TABLET | Refills: 1 | Status: SHIPPED | OUTPATIENT
Start: 2021-10-06 | End: 2022-02-28

## 2021-10-18 DIAGNOSIS — G43.719 INTRACTABLE CHRONIC MIGRAINE WITHOUT AURA AND WITHOUT STATUS MIGRAINOSUS: ICD-10-CM

## 2021-10-18 RX ORDER — FREMANEZUMAB-VFRM 225 MG/1.5ML
INJECTION SUBCUTANEOUS
Qty: 4.5 ML | OUTPATIENT
Start: 2021-10-18

## 2021-12-07 ENCOUNTER — OFFICE VISIT (OUTPATIENT)
Dept: FAMILY MEDICINE CLINIC | Facility: CLINIC | Age: 36
End: 2021-12-07

## 2021-12-07 ENCOUNTER — PRIOR AUTHORIZATION (OUTPATIENT)
Dept: NEUROLOGY | Facility: CLINIC | Age: 36
End: 2021-12-07

## 2021-12-07 ENCOUNTER — TELEPHONE (OUTPATIENT)
Dept: NEUROLOGY | Facility: CLINIC | Age: 36
End: 2021-12-07

## 2021-12-07 VITALS
DIASTOLIC BLOOD PRESSURE: 76 MMHG | BODY MASS INDEX: 37.45 KG/M2 | WEIGHT: 238.6 LBS | TEMPERATURE: 96.9 F | SYSTOLIC BLOOD PRESSURE: 118 MMHG | HEIGHT: 67 IN | HEART RATE: 94 BPM | OXYGEN SATURATION: 99 %

## 2021-12-07 DIAGNOSIS — L23.9 ALLERGIC DERMATITIS: Primary | ICD-10-CM

## 2021-12-07 PROCEDURE — 99213 OFFICE O/P EST LOW 20 MIN: CPT | Performed by: NURSE PRACTITIONER

## 2021-12-07 RX ORDER — PREDNISONE 20 MG/1
TABLET ORAL
Qty: 12 TABLET | Refills: 0 | Status: SHIPPED | OUTPATIENT
Start: 2021-12-07 | End: 2021-12-13

## 2021-12-07 RX ORDER — TRIAMCINOLONE ACETONIDE 40 MG/ML
40 INJECTION, SUSPENSION INTRA-ARTICULAR; INTRAMUSCULAR ONCE
Status: DISCONTINUED | OUTPATIENT
Start: 2021-12-07 | End: 2022-03-29

## 2021-12-07 RX ORDER — FAMOTIDINE 20 MG/1
20 TABLET, FILM COATED ORAL 2 TIMES DAILY
Qty: 14 TABLET | Refills: 0 | Status: SHIPPED | OUTPATIENT
Start: 2021-12-07 | End: 2021-12-14

## 2021-12-07 NOTE — TELEPHONE ENCOUNTER
Provider: COCO HENDERSON  Caller: SAM GALINDO  Relationship to Patient: SELF      Reason for Call:PT RECEIVED THE CALL ABOUT THE FRANNIE ANDIS CALLING BACK STATING THAT SHE DOES NOT NEED A APPT, SHE DOES NOT HAVE ANYTHING GOING ON.         IF YOU HAVE ANY QUESTIONS YOU CAN REACH HER [phone]

## 2021-12-07 NOTE — PROGRESS NOTES
"Chief Complaint  Rash    Subjective          Preeti Lamb is a 36 y.o. female who presents today to [facility] for initial evaluation     HPI:   History of Present Illness    Presents to clinic for c/o rash \"everywhere\" that started today at 1230. Denies any new exposures and does not recall anything that precipitated symptoms. Has tried benadryl, didn't help with itching. Associated symptoms: none    The following portions of the patient's history were reviewed and updated as appropriate: allergies, current medications, past family history, past medical history, past social history, past surgical history and problem list.    Objective     Problem List:  Patient Active Problem List   Diagnosis   • Depression   • Thyroid disease   • Hashimoto's thyroiditis   • Polycystic ovary syndrome   • Intractable chronic migraine without aura and without status migrainosus   • SCOTTIE (obstructive sleep apnea)   • Migraine without aura and without status migrainosus, not intractable       Allergy:   Allergies   Allergen Reactions   • Penicillins Hives        Discontinued Medications:  There are no discontinued medications.    Current Medications:   Current Outpatient Medications   Medication Sig Dispense Refill   • Ajovy 225 MG/1.5ML solution auto-injector Inject 675 mg under the skin into the appropriate area as directed Every 3 (Three) Months. 3 pen 3   • buPROPion (WELLBUTRIN) 75 MG tablet Take 1 tablet by mouth 2 (Two) Times a Day. 60 tablet 5   • rimegepant 75 MG dispersible tablet Take 75 mg by mouth Daily As Needed (migraine). 8 tablet 5   • sertraline (ZOLOFT) 100 MG tablet TAKE 1 TABLET BY MOUTH DAILY 90 tablet 1   • famotidine (Pepcid) 20 MG tablet Take 1 tablet by mouth 2 (Two) Times a Day for 7 days. Until symptoms resolve 14 tablet 0   • levothyroxine (SYNTHROID, LEVOTHROID) 50 MCG tablet TAKE 1 TABLET BY MOUTH DAILY 30 tablet 5   • predniSONE (DELTASONE) 20 MG tablet Take 3 tablets by " mouth Daily for 2 days, THEN 2 tablets Daily for 2 days, THEN 1 tablet Daily for 2 days. 12 tablet 0   • SUMAtriptan (Imitrex) 50 MG tablet Take 1 tablet by mouth 1 (One) Time As Needed for Migraine. Take one tablet at onset of headache. May repeat dose one time in 2 hours if headache not relieved. 30 tablet 2     Current Facility-Administered Medications   Medication Dose Route Frequency Provider Last Rate Last Admin   • triamcinolone acetonide (KENALOG-40) injection 40 mg  40 mg Intramuscular Once Kera Mcqueen APRN           Past Medical History:  Past Medical History:   Diagnosis Date   • Allergic    • Depression    • Functional ovarian cysts    • Hashimoto's disease    • Low serum progesterone    • PCOS (polycystic ovarian syndrome)    • Sinus complaint    • Thyroid disease        Past Surgical History:  Past Surgical History:   Procedure Laterality Date   • DILATATION AND CURETTAGE     • LEEP         Social History:  Social History     Socioeconomic History   • Marital status:    Tobacco Use   • Smoking status: Never Smoker   • Smokeless tobacco: Never Used   Vaping Use   • Vaping Use: Never used   Substance and Sexual Activity   • Alcohol use: Yes     Comment: rarely   • Drug use: Never   • Sexual activity: Yes       Family History:  Family History   Problem Relation Age of Onset   • Diabetes Mother    • Hypertension Mother    • Crohn's disease Mother    • Migraines Mother    • Hypertension Father    • Alcohol abuse Father    • Atrial fibrillation Father    • Thyroid disease Maternal Grandmother        Review of Systems:  Review of Systems   Constitutional: Negative for fever.   HENT: Negative for trouble swallowing.    Respiratory: Negative for shortness of breath and wheezing.    Skin: Positive for rash.       Physical Exam:  Physical Exam  Vitals and nursing note reviewed.   Constitutional:       General: She is not in acute distress.     Appearance: Normal appearance. She is not ill-appearing or  "toxic-appearing.   HENT:      Head: Normocephalic.      Mouth/Throat:      Mouth: Mucous membranes are moist.      Pharynx: Oropharynx is clear.   Cardiovascular:      Rate and Rhythm: Normal rate and regular rhythm.      Heart sounds: Normal heart sounds.   Pulmonary:      Effort: Pulmonary effort is normal. No respiratory distress.      Breath sounds: Normal breath sounds. No wheezing.   Abdominal:      General: Bowel sounds are normal.      Palpations: Abdomen is soft.   Musculoskeletal:         General: No swelling.   Lymphadenopathy:      Cervical: No cervical adenopathy.   Skin:     General: Skin is warm and dry.      Coloration: Skin is not pale.      Comments: Hives noted to anterior neck extending up to chin, anterior chest, breasts, side of trunk, extends down to mid abdomen.  Hives noted to posterior neck extending up into hairline.  Hives noted to bilateral forearms and bilateral anterior thighs.   Neurological:      Mental Status: She is alert and oriented to person, place, and time.   Psychiatric:         Mood and Affect: Mood normal.         Behavior: Behavior normal.         Thought Content: Thought content normal.         Judgment: Judgment normal.         Vital Signs:   /76 (BP Location: Right arm, Patient Position: Sitting, Cuff Size: Adult)   Pulse 94   Temp 96.9 °F (36.1 °C) (Temporal)   Ht 170.2 cm (67\")   Wt 108 kg (238 lb 9.6 oz)   SpO2 99%   BMI 37.37 kg/m²  RR: 16          Assessment and Plan   Diagnoses and all orders for this visit:    1. Allergic dermatitis (Primary)  -     predniSONE (DELTASONE) 20 MG tablet; Take 3 tablets by mouth Daily for 2 days, THEN 2 tablets Daily for 2 days, THEN 1 tablet Daily for 2 days.  Dispense: 12 tablet; Refill: 0  -     triamcinolone acetonide (KENALOG-40) injection 40 mg  -     famotidine (Pepcid) 20 MG tablet; Take 1 tablet by mouth 2 (Two) Times a Day for 7 days. Until symptoms resolve  Dispense: 14 tablet; Refill: 0    Based on today's " history and physical, I am not able to determine origin of your symptoms.  Monitor your environment and avoid anything that may seem to be precipitating her symptoms.  Regimen as above.   Do not start Oral prednisone until tomorrow.  Continue home Benadryl.    Discussed possible differential diagnoses, testing, treatment, recommended non-pharmacological interventions, risks, warning signs to monitor for that would indicate need for follow-up in clinic or ER. If no improvement with these regimens or you have new or worsening symptoms follow-up. Patient verbalizes understanding and agreement with plan of care. Denies further needs or concerns.     I spent 20 minutes caring for patient on this date of service. This time includes time spent by me in the following activities: preparing for the visit, reviewing tests, obtaining and/or reviewing a separately obtained history, performing a medically appropriate examination and/or evaluation, counseling and educating the patient/family/caregiver, ordering medications, tests, or procedures and documenting information in the medical record    Meds ordered during this visit:  New Medications Ordered This Visit   Medications   • predniSONE (DELTASONE) 20 MG tablet     Sig: Take 3 tablets by mouth Daily for 2 days, THEN 2 tablets Daily for 2 days, THEN 1 tablet Daily for 2 days.     Dispense:  12 tablet     Refill:  0   • triamcinolone acetonide (KENALOG-40) injection 40 mg   • famotidine (Pepcid) 20 MG tablet     Sig: Take 1 tablet by mouth 2 (Two) Times a Day for 7 days. Until symptoms resolve     Dispense:  14 tablet     Refill:  0       Patient Instructions:  Patient instructions given for the following visit diagnosis:    ICD-10-CM ICD-9-CM   1. Allergic dermatitis  L23.9 692.9       Follow Up   Return if symptoms worsen or fail to improve.        This document has been electronically signed by COCO Leal  December 7, 2021 16:10 EST    Patient was given instructions  and counseling regarding her condition or for health maintenance advice. Please see specific information pulled into the AVS if appropriate.     Part of this note may be an electronic transcription/translation of spoken language to printed text using the Dragon Dictation System.

## 2021-12-22 DIAGNOSIS — G43.719 INTRACTABLE CHRONIC MIGRAINE WITHOUT AURA AND WITHOUT STATUS MIGRAINOSUS: ICD-10-CM

## 2021-12-22 DIAGNOSIS — F33.1 MODERATE EPISODE OF RECURRENT MAJOR DEPRESSIVE DISORDER (HCC): ICD-10-CM

## 2021-12-22 RX ORDER — FREMANEZUMAB-VFRM 225 MG/1.5ML
INJECTION SUBCUTANEOUS
Qty: 4.5 ML | Refills: 1 | Status: SHIPPED | OUTPATIENT
Start: 2021-12-22 | End: 2022-03-29 | Stop reason: SDUPTHER

## 2021-12-22 NOTE — TELEPHONE ENCOUNTER
Patient last had a visit on 1/29/2021 via telehealth.  No showed for 5/28/2021 visit.  Does not have a follow-up.  I will refill for 4 months but needs to be scheduled for follow-up.  Thanks, COCO Malone.

## 2021-12-22 NOTE — TELEPHONE ENCOUNTER
Rx Refill Note  Requested Prescriptions     Pending Prescriptions Disp Refills   • Ajovy 225 MG/1.5ML solution auto-injector [Pharmacy Med Name: AJOVY 225MG/1.5ML PF AUT INJ 1.5ML] 4.5 mL      Sig: INJECT 675MG UNDER THE SKIN EVERY 3 MONTHS AS DIRECTED      Last office visit with prescribing clinician: 10/29/2020      Next office visit with prescribing clinician: NO F/U SCHEDULED           Ella Cazares CMA  12/22/21, 15:27 EST

## 2021-12-23 RX ORDER — BUPROPION HYDROCHLORIDE 75 MG/1
TABLET ORAL
Qty: 60 TABLET | Refills: 0 | Status: SHIPPED | OUTPATIENT
Start: 2021-12-23 | End: 2022-03-29 | Stop reason: SDUPTHER

## 2022-02-27 DIAGNOSIS — F33.1 MODERATE EPISODE OF RECURRENT MAJOR DEPRESSIVE DISORDER: ICD-10-CM

## 2022-02-28 RX ORDER — SERTRALINE HYDROCHLORIDE 100 MG/1
100 TABLET, FILM COATED ORAL DAILY
Qty: 90 TABLET | Refills: 0 | Status: SHIPPED | OUTPATIENT
Start: 2022-02-28 | End: 2022-03-29 | Stop reason: SDUPTHER

## 2022-03-29 ENCOUNTER — OFFICE VISIT (OUTPATIENT)
Dept: FAMILY MEDICINE CLINIC | Facility: CLINIC | Age: 37
End: 2022-03-29

## 2022-03-29 VITALS
HEIGHT: 67 IN | HEART RATE: 100 BPM | OXYGEN SATURATION: 99 % | SYSTOLIC BLOOD PRESSURE: 122 MMHG | TEMPERATURE: 97.5 F | DIASTOLIC BLOOD PRESSURE: 72 MMHG | WEIGHT: 240.8 LBS | BODY MASS INDEX: 37.79 KG/M2

## 2022-03-29 DIAGNOSIS — F33.1 MODERATE EPISODE OF RECURRENT MAJOR DEPRESSIVE DISORDER: ICD-10-CM

## 2022-03-29 DIAGNOSIS — E28.2 POLYCYSTIC OVARY SYNDROME: ICD-10-CM

## 2022-03-29 DIAGNOSIS — E66.01 CLASS 2 SEVERE OBESITY DUE TO EXCESS CALORIES WITH SERIOUS COMORBIDITY AND BODY MASS INDEX (BMI) OF 37.0 TO 37.9 IN ADULT: ICD-10-CM

## 2022-03-29 DIAGNOSIS — E07.9 THYROID DISEASE: Primary | ICD-10-CM

## 2022-03-29 DIAGNOSIS — G43.719 INTRACTABLE CHRONIC MIGRAINE WITHOUT AURA AND WITHOUT STATUS MIGRAINOSUS: ICD-10-CM

## 2022-03-29 PROCEDURE — 99214 OFFICE O/P EST MOD 30 MIN: CPT | Performed by: NURSE PRACTITIONER

## 2022-03-29 RX ORDER — BUPROPION HYDROCHLORIDE 75 MG/1
75 TABLET ORAL DAILY
Qty: 90 TABLET | Refills: 2 | Status: SHIPPED | OUTPATIENT
Start: 2022-03-29 | End: 2023-01-17

## 2022-03-29 RX ORDER — RIZATRIPTAN BENZOATE 10 MG/1
TABLET ORAL
Qty: 30 TABLET | Refills: 2 | Status: SHIPPED | OUTPATIENT
Start: 2022-03-29

## 2022-03-29 RX ORDER — FREMANEZUMAB-VFRM 225 MG/1.5ML
225 INJECTION SUBCUTANEOUS
Qty: 4.5 ML | Refills: 3 | Status: SHIPPED | OUTPATIENT
Start: 2022-03-29 | End: 2022-04-04 | Stop reason: SDUPTHER

## 2022-03-29 RX ORDER — SERTRALINE HYDROCHLORIDE 100 MG/1
100 TABLET, FILM COATED ORAL DAILY
Qty: 90 TABLET | Refills: 0 | Status: SHIPPED | OUTPATIENT
Start: 2022-03-29

## 2022-03-29 RX ORDER — LEVOTHYROXINE SODIUM 0.05 MG/1
50 TABLET ORAL DAILY
Qty: 30 TABLET | Refills: 5 | Status: SHIPPED | OUTPATIENT
Start: 2022-03-29

## 2022-03-30 ENCOUNTER — LAB (OUTPATIENT)
Dept: FAMILY MEDICINE CLINIC | Facility: CLINIC | Age: 37
End: 2022-03-30

## 2022-03-30 DIAGNOSIS — E07.9 THYROID DISEASE: ICD-10-CM

## 2022-03-30 DIAGNOSIS — E28.2 POLYCYSTIC OVARY SYNDROME: ICD-10-CM

## 2022-03-30 LAB
ALBUMIN SERPL-MCNC: 4.4 G/DL (ref 3.5–5.2)
ALBUMIN/GLOB SERPL: 1.7 G/DL
ALP SERPL-CCNC: 85 U/L (ref 39–117)
ALT SERPL W P-5'-P-CCNC: 19 U/L (ref 1–33)
ANION GAP SERPL CALCULATED.3IONS-SCNC: 10.1 MMOL/L (ref 5–15)
AST SERPL-CCNC: 17 U/L (ref 1–32)
BASOPHILS # BLD AUTO: 0.05 10*3/MM3 (ref 0–0.2)
BASOPHILS NFR BLD AUTO: 0.9 % (ref 0–1.5)
BILIRUB SERPL-MCNC: 0.3 MG/DL (ref 0–1.2)
BUN SERPL-MCNC: 8 MG/DL (ref 6–20)
BUN/CREAT SERPL: 8.8 (ref 7–25)
CALCIUM SPEC-SCNC: 9.1 MG/DL (ref 8.6–10.5)
CHLORIDE SERPL-SCNC: 105 MMOL/L (ref 98–107)
CHOLEST SERPL-MCNC: 148 MG/DL (ref 0–200)
CO2 SERPL-SCNC: 23.9 MMOL/L (ref 22–29)
CREAT SERPL-MCNC: 0.91 MG/DL (ref 0.57–1)
DEPRECATED RDW RBC AUTO: 39.9 FL (ref 37–54)
EGFRCR SERPLBLD CKD-EPI 2021: 83 ML/MIN/1.73
EOSINOPHIL # BLD AUTO: 0.16 10*3/MM3 (ref 0–0.4)
EOSINOPHIL NFR BLD AUTO: 3 % (ref 0.3–6.2)
ERYTHROCYTE [DISTWIDTH] IN BLOOD BY AUTOMATED COUNT: 12.5 % (ref 12.3–15.4)
GLOBULIN UR ELPH-MCNC: 2.6 GM/DL
GLUCOSE SERPL-MCNC: 81 MG/DL (ref 65–99)
HBA1C MFR BLD: 4.8 % (ref 4.8–5.6)
HCT VFR BLD AUTO: 39.2 % (ref 34–46.6)
HDLC SERPL-MCNC: 44 MG/DL (ref 40–60)
HGB BLD-MCNC: 12.8 G/DL (ref 12–15.9)
IMM GRANULOCYTES # BLD AUTO: 0.01 10*3/MM3 (ref 0–0.05)
IMM GRANULOCYTES NFR BLD AUTO: 0.2 % (ref 0–0.5)
LDLC SERPL CALC-MCNC: 83 MG/DL (ref 0–100)
LDLC/HDLC SERPL: 1.84 {RATIO}
LYMPHOCYTES # BLD AUTO: 1.21 10*3/MM3 (ref 0.7–3.1)
LYMPHOCYTES NFR BLD AUTO: 22.6 % (ref 19.6–45.3)
MCH RBC QN AUTO: 28.4 PG (ref 26.6–33)
MCHC RBC AUTO-ENTMCNC: 32.7 G/DL (ref 31.5–35.7)
MCV RBC AUTO: 87.1 FL (ref 79–97)
MONOCYTES # BLD AUTO: 0.42 10*3/MM3 (ref 0.1–0.9)
MONOCYTES NFR BLD AUTO: 7.9 % (ref 5–12)
NEUTROPHILS NFR BLD AUTO: 3.5 10*3/MM3 (ref 1.7–7)
NEUTROPHILS NFR BLD AUTO: 65.4 % (ref 42.7–76)
NRBC BLD AUTO-RTO: 0 /100 WBC (ref 0–0.2)
PLATELET # BLD AUTO: 174 10*3/MM3 (ref 140–450)
PMV BLD AUTO: 10.7 FL (ref 6–12)
POTASSIUM SERPL-SCNC: 4 MMOL/L (ref 3.5–5.2)
PROT SERPL-MCNC: 7 G/DL (ref 6–8.5)
RBC # BLD AUTO: 4.5 10*6/MM3 (ref 3.77–5.28)
SODIUM SERPL-SCNC: 139 MMOL/L (ref 136–145)
T4 FREE SERPL-MCNC: 1.09 NG/DL (ref 0.93–1.7)
TRIGL SERPL-MCNC: 115 MG/DL (ref 0–150)
TSH SERPL DL<=0.05 MIU/L-ACNC: 3.38 UIU/ML (ref 0.27–4.2)
VIT B12 BLD-MCNC: 344 PG/ML (ref 211–946)
VLDLC SERPL-MCNC: 21 MG/DL (ref 5–40)
WBC NRBC COR # BLD: 5.35 10*3/MM3 (ref 3.4–10.8)

## 2022-03-30 PROCEDURE — 80053 COMPREHEN METABOLIC PANEL: CPT | Performed by: NURSE PRACTITIONER

## 2022-03-30 PROCEDURE — 82607 VITAMIN B-12: CPT | Performed by: NURSE PRACTITIONER

## 2022-03-30 PROCEDURE — 85025 COMPLETE CBC W/AUTO DIFF WBC: CPT | Performed by: NURSE PRACTITIONER

## 2022-03-30 PROCEDURE — 80061 LIPID PANEL: CPT | Performed by: NURSE PRACTITIONER

## 2022-03-30 PROCEDURE — 83036 HEMOGLOBIN GLYCOSYLATED A1C: CPT | Performed by: NURSE PRACTITIONER

## 2022-03-30 PROCEDURE — 84439 ASSAY OF FREE THYROXINE: CPT | Performed by: NURSE PRACTITIONER

## 2022-03-30 PROCEDURE — 84443 ASSAY THYROID STIM HORMONE: CPT | Performed by: NURSE PRACTITIONER

## 2022-03-30 NOTE — PROGRESS NOTES
Subjective   Preeti Lamb is a 36 y.o. female.   Chief Compliant: The patient presents with Thyroid Problem, Weight Gain, and Migraine    PCOS with no improvements.  Would like to start medications to assist with weight and better control.      Thyroid Problem  Presents for follow-up (Known hypothyroidism) visit. The condition has lasted for 1 year. Symptoms include anxiety and fatigue. Patient reports no tremors or weight gain. The symptoms have been stable.   Depression  Visit Type: initial (started treatment at the age of 11.  Feels her symtpoms are stable. )  Progression since onset: stable  Patient presents with the following symptoms: nervousness/anxiety.  Patient is not experiencing: suicidal ideas, suicidal planning, thoughts of death and weight gain.  Frequency of symptoms: most days   Severity: mild   Aggravated by: family issues  Sleep quality: fair  Nighttime awakenings: occasional    Migraine  Headache pattern:  Headache sometimes there, sometimes not at all (Seen Neurology with tremdous improvement with injections.  Would like to continue and not return to Neurology as long as she is controlled.)  Providers seen:  Neurologist  Previous testing:  MRI, blood work and CT     The following portions of the patient's history were reviewed and updated as appropriate: allergies, current medications, past family history, past social history, past surgical history and problem list.      Review of Systems   Constitutional: Positive for fatigue. Negative for weight gain.   Respiratory: Negative.    Cardiovascular: Negative.    Neurological: Positive for headaches. Negative for dizziness, tremors, seizures, syncope, facial asymmetry, speech difficulty, weakness, light-headedness and numbness.   Psychiatric/Behavioral: Positive for agitation and sleep disturbance. Negative for self-injury and suicidal ideas. The patient is nervous/anxious.    All other systems reviewed and are negative.      Procedures    Vitals:  "Blood pressure 122/72, pulse 100, temperature 97.5 °F (36.4 °C), height 170.2 cm (67.01\"), weight 109 kg (240 lb 12.8 oz), SpO2 99 %, not currently breastfeeding.     Allergies:   Allergies   Allergen Reactions   • Penicillins Hives          Objective   Physical Exam   Constitutional: She is oriented to person, place, and time. She appears well-developed. No distress.   HENT:   Head: Normocephalic.   Right Ear: Tympanic membrane, external ear and ear canal normal.   Left Ear: Tympanic membrane, external ear and ear canal normal.   Nose: Nose normal. No rhinorrhea or congestion.   Mouth/Throat: No oropharyngeal exudate.   Eyes: Pupils are equal, round, and reactive to light. Conjunctivae are normal. Right eye exhibits no discharge. Left eye exhibits no discharge.   Cardiovascular: Normal rate, regular rhythm and normal heart sounds.   No murmur heard.  Pulmonary/Chest: Effort normal and breath sounds normal.   Musculoskeletal: Normal range of motion.   Neurological: She is alert and oriented to person, place, and time. She displays no weakness and normal reflexes. No cranial nerve deficit or sensory deficit. She exhibits normal muscle tone. Coordination and gait normal.   Skin: Skin is warm and dry. She is not diaphoretic.   Psychiatric: Her behavior is normal.   Nursing note and vitals reviewed.      During this visit the following were done:  Labs Reviewed []    Labs Ordered [x]    Radiology Reports Reviewed [x]    Radiology Ordered [x]    PCP Records Reviewed []    Referring Provider Records Reviewed []    ER Records Reviewed []    Hospital Records Reviewed []    History Obtained From Family []    Radiology Images Reviewed []    Other Reviewed [x]    Records Requested []      Diagnoses and all orders for this visit:    1. Thyroid disease (Primary)  -     levothyroxine (SYNTHROID, LEVOTHROID) 50 MCG tablet; Take 1 tablet by mouth Daily.  Dispense: 30 tablet; Refill: 5  -     TSH; Future    2. Intractable chronic " migraine without aura and without status migrainosus  -     Ajovy 225 MG/1.5ML solution auto-injector; Inject 225 mg into the appropriate muscle as directed by prescriber Every 30 (Thirty) Days.  Dispense: 4.5 mL; Refill: 3  -     rizatriptan (Maxalt) 10 MG tablet; Take 1 tablet at onset of migraine. May repeat in 2 hours if needed  Dispense: 30 tablet; Refill: 2    3. Moderate episode of recurrent major depressive disorder (HCC)  -     buPROPion (WELLBUTRIN) 75 MG tablet; Take 1 tablet by mouth Daily.  Dispense: 90 tablet; Refill: 2  -     sertraline (ZOLOFT) 100 MG tablet; Take 1 tablet by mouth Daily.  Dispense: 90 tablet; Refill: 0    4. Polycystic ovary syndrome  -     CBC Auto Differential; Future  -     Comprehensive Metabolic Panel; Future  -     Lipid Panel; Future  -     TSH; Future  -     T4, Free; Future  -     Vitamin B12; Future  -     Semaglutide,0.25 or 0.5MG/DOS, (OZEMPIC) 2 MG/1.5ML solution pen-injector; Inject 0.25 mg under the skin into the appropriate area as directed 1 (One) Time Per Week.  Dispense: 6 mL; Refill: 3  -     Hemoglobin A1c; Future    5. Class 2 severe obesity due to excess calories with serious comorbidity and body mass index (BMI) of 37.0 to 37.9 in adult ([facility])           Patient's Body mass index is 37.71 kg/m². BMI is above normal parameters. Recommendations include: exercise counseling and nutrition counseling.

## 2022-04-04 ENCOUNTER — TELEPHONE (OUTPATIENT)
Dept: FAMILY MEDICINE CLINIC | Facility: CLINIC | Age: 37
End: 2022-04-04

## 2022-04-04 DIAGNOSIS — G43.719 INTRACTABLE CHRONIC MIGRAINE WITHOUT AURA AND WITHOUT STATUS MIGRAINOSUS: ICD-10-CM

## 2022-04-04 RX ORDER — FREMANEZUMAB-VFRM 225 MG/1.5ML
225 INJECTION SUBCUTANEOUS
Qty: 4.5 ML | Refills: 3 | Status: SHIPPED | OUTPATIENT
Start: 2022-04-04

## 2022-04-04 NOTE — TELEPHONE ENCOUNTER
----- Message from COCO Waite sent at 4/2/2022  6:03 PM EDT -----  Labs are normal.  B12 is on the low side of normal I would recommend daily B12 supplements    Patient notified & verbalized understanding.

## 2022-04-05 DIAGNOSIS — E28.2 POLYCYSTIC OVARY SYNDROME: ICD-10-CM

## 2022-04-05 NOTE — TELEPHONE ENCOUNTER
Caller: Preeti Lamb    Relationship: Self    Best call back number: [phone]    Which medication are you concerned about: Semaglutide,0.25 or 0.5MG/DOS, (OZEMPIC) 2 MG/1.5ML solution pen-injector    Who prescribed you this medication: CHAD CANTOR    What are your concerns: PATIENT STATED THIS MEDICATION NEEDS PRIOR AUTHORIZATION AND HAS TO BE PRESCRIBED IN 31 DAY QUANTITIES

## 2022-04-14 ENCOUNTER — TELEPHONE (OUTPATIENT)
Dept: FAMILY MEDICINE CLINIC | Facility: CLINIC | Age: 37
End: 2022-04-14

## 2022-04-14 NOTE — TELEPHONE ENCOUNTER
Notified pt that in order to get her medication, Ajovy, she would need to return to neurology. Pt expressed understanding.

## 2022-05-25 ENCOUNTER — TELEPHONE (OUTPATIENT)
Dept: FAMILY MEDICINE CLINIC | Facility: CLINIC | Age: 37
End: 2022-05-25

## 2022-09-15 DIAGNOSIS — E07.9 THYROID DISEASE: ICD-10-CM

## 2022-09-15 RX ORDER — LEVOTHYROXINE SODIUM 0.05 MG/1
50 TABLET ORAL DAILY
Qty: 30 TABLET | Refills: 5 | OUTPATIENT
Start: 2022-09-15

## 2023-01-17 DIAGNOSIS — F33.1 MODERATE EPISODE OF RECURRENT MAJOR DEPRESSIVE DISORDER: ICD-10-CM

## 2023-01-17 RX ORDER — BUPROPION HYDROCHLORIDE 75 MG/1
75 TABLET ORAL DAILY
Qty: 90 TABLET | Refills: 2 | Status: SHIPPED | OUTPATIENT
Start: 2023-01-17

## 2023-04-20 DIAGNOSIS — F33.1 MODERATE EPISODE OF RECURRENT MAJOR DEPRESSIVE DISORDER: ICD-10-CM

## 2023-04-21 DIAGNOSIS — F33.1 MODERATE EPISODE OF RECURRENT MAJOR DEPRESSIVE DISORDER: ICD-10-CM

## 2023-04-21 RX ORDER — SERTRALINE HYDROCHLORIDE 100 MG/1
100 TABLET, FILM COATED ORAL DAILY
Qty: 90 TABLET | Refills: 0 | OUTPATIENT
Start: 2023-04-21